# Patient Record
Sex: FEMALE | Race: AMERICAN INDIAN OR ALASKA NATIVE
[De-identification: names, ages, dates, MRNs, and addresses within clinical notes are randomized per-mention and may not be internally consistent; named-entity substitution may affect disease eponyms.]

---

## 2018-07-03 ENCOUNTER — HOSPITAL ENCOUNTER (EMERGENCY)
Dept: HOSPITAL 5 - ED | Age: 28
Discharge: LEFT BEFORE DISCHARGE | End: 2018-07-03
Payer: SELF-PAY

## 2018-07-03 VITALS — SYSTOLIC BLOOD PRESSURE: 110 MMHG | DIASTOLIC BLOOD PRESSURE: 73 MMHG

## 2018-07-03 DIAGNOSIS — Z53.21: ICD-10-CM

## 2018-07-03 DIAGNOSIS — R10.9: Primary | ICD-10-CM

## 2018-07-03 LAB
BILIRUB UR QL STRIP: (no result)
BLOOD UR QL VISUAL: (no result)
PH UR STRIP: 7 [PH] (ref 5–7)
PROT UR STRIP-MCNC: (no result) MG/DL
RBC #/AREA URNS HPF: < 1 /HPF (ref 0–6)
UROBILINOGEN UR-MCNC: < 2 MG/DL (ref ?–2)
WBC #/AREA URNS HPF: < 1 /HPF (ref 0–6)

## 2018-07-03 PROCEDURE — 81001 URINALYSIS AUTO W/SCOPE: CPT

## 2018-07-03 PROCEDURE — 81025 URINE PREGNANCY TEST: CPT

## 2018-08-24 ENCOUNTER — HOSPITAL ENCOUNTER (EMERGENCY)
Dept: HOSPITAL 5 - ED | Age: 28
Discharge: HOME | End: 2018-08-24
Payer: SELF-PAY

## 2018-08-24 VITALS — SYSTOLIC BLOOD PRESSURE: 133 MMHG | DIASTOLIC BLOOD PRESSURE: 71 MMHG

## 2018-08-24 DIAGNOSIS — R31.29: Primary | ICD-10-CM

## 2018-08-24 DIAGNOSIS — Z87.891: ICD-10-CM

## 2018-08-24 DIAGNOSIS — N39.0: ICD-10-CM

## 2018-08-24 DIAGNOSIS — J45.909: ICD-10-CM

## 2018-08-24 DIAGNOSIS — Z90.79: ICD-10-CM

## 2018-08-24 DIAGNOSIS — Z90.721: ICD-10-CM

## 2018-08-24 LAB
BILIRUB UR QL STRIP: (no result)
BLOOD UR QL VISUAL: (no result)
MUCOUS THREADS #/AREA URNS HPF: (no result) /HPF
PH UR STRIP: 5 [PH] (ref 5–7)
PROT UR STRIP-MCNC: (no result) MG/DL
RBC #/AREA URNS HPF: 9 /HPF (ref 0–6)
UROBILINOGEN UR-MCNC: 2 MG/DL (ref ?–2)
WBC #/AREA URNS HPF: 2 /HPF (ref 0–6)

## 2018-08-24 PROCEDURE — 99283 EMERGENCY DEPT VISIT LOW MDM: CPT

## 2018-08-24 PROCEDURE — 81025 URINE PREGNANCY TEST: CPT

## 2018-08-24 PROCEDURE — 81001 URINALYSIS AUTO W/SCOPE: CPT

## 2018-08-24 NOTE — EMERGENCY DEPARTMENT REPORT
ED Female  HPI





- General


Chief complaint: Urogenital-Female


Stated complaint: UTI


Time Seen by Provider: 18 09:10


Source: patient


Mode of arrival: Ambulatory


Limitations: No Limitations





- History of Present Illness


Initial comments: 





29 yo female with a past medical history asthma, previous ectopic pregnancy, C-

section, and right sided ovary and fallopian tube removal presents to the  

hospital complaining of burning with urination and malodorous urine 2 weeks.  

Mild suprapubic discomfort reported.  She denies vaginal discharge, concern for 

STD, vaginal bleeding, fever, or flank pain.  





- Related Data


 Previous Rx's











 Medication  Instructions  Recorded  Last Taken  Type


 


Nitrofurantoin Monohyd/M-Cryst 100 mg PO BID #10 capsule 18 Unknown Rx





[Macrobid 100 mg Capsule]    











 Allergies











Allergy/AdvReac Type Severity Reaction Status Date / Time


 


latex Allergy  Anaphylaxis Verified 18 08:03














ED Review of Systems


ROS: 


Stated complaint: UTI


Other details as noted in HPI





Comment: All other systems reviewed and negative





ED Past Medical Hx





- Past Medical History


Previous Medical History?: Yes


Hx Asthma: Yes





- Surgical History


Past Surgical History?: Yes


Additional Surgical History: ectopic, , cysts on right overy removed, 

left fallopian tube





- Social History


Smoking Status: Former Smoker


Substance Use Type: Alcohol





- Medications


Home Medications: 


 Home Medications











 Medication  Instructions  Recorded  Confirmed  Last Taken  Type


 


Nitrofurantoin Monohyd/M-Cryst 100 mg PO BID #10 capsule 18  Unknown Rx





[Macrobid 100 mg Capsule]     














ED Physical Exam





- General


Limitations: No Limitations





- Other


Other exam information: 





General: No limitations, patient is alert in no acute distress


Head exam: Atraumatic, normocephalic


Eyes exam: Normal appearance


ENT: Moist mucous membrane, normal oropharynx


Neck exam: Normal inspection, full range of motion, no meningismus nontender


Respiratory exam: Clear to auscultation bilateral, no wheezes, rales, crackles


Cardiovascular: Normal rate and rhythm, normal heart sounds


Abdomen: Soft, nondistended, mild suprapubic discomfort, with normal bowel 

sounds, no rebound, or guarding


Extremity: Full range of motion normal inspection no deformity


Back: Normal Inspection, full range of motion, no tenderness


Neurologic: Alert, oriented x3, cranial nerves intact, no motor or sensory 

deficit


Psychiatric: normal affect, normal mood


Skin: Warm, dry, intact





ED Course





 Vital Signs











  18





  08:00


 


Temperature 97.5 F L


 


Pulse Rate 68


 


Respiratory 18





Rate 


 


Blood Pressure 133/71


 


O2 Sat by Pulse 99





Oximetry 














ED Medical Decision Making





- Medical Decision Making





Patient has UTI symptoms and bloody urine and therefore will be covered for 

UTI.  Patient states that she frequently has blood in her urine when is 

checked.  Outpatient follow up PMD and neurology encouraged.  Dose of Macrobid 

provided in the ED prior to discharge





- Differential Diagnosis


UTI, cervicitis, PID, vaginitis, kidney stone


Critical Care Time: No


Critical care attestation.: 


If time is entered above; I have spent that time in minutes in the direct care 

of this critically ill patient, excluding procedure time.








ED Disposition


Clinical Impression: 


 Microscopic hematuria, UTI (urinary tract infection)





Disposition:  TO HOME OR SELFCARE


Is pt being admited?: No


Does the pt Need Aspirin: No


Condition: Stable


Instructions:  Acute Hematuria (ED), Urinary Tract Infection in Women (ED)


Additional Instructions: 


Take the medication as prescribed.  Follow up with your doctor.  Return if 

symptoms worsen as indicated by your discharge instructions


Prescriptions: 


Nitrofurantoin Monohyd/M-Cryst [Macrobid 100 mg Capsule] 100 mg PO BID #10 

capsule


Referrals: 


[facility] CARE,MD [Primary Care Provider] - 3-5 Days


OLIVIA HERRERA MD [Staff Physician] - 3-5 Days (Urologist)


SHAN MCGOWAN MD [Staff Physician] - 3-5 Days (Primary care doctor)


[facility] [Provider Group] - 3-5 Days (Primary care clinic)


Time of Disposition: 09:40